# Patient Record
Sex: MALE | Race: BLACK OR AFRICAN AMERICAN
[De-identification: names, ages, dates, MRNs, and addresses within clinical notes are randomized per-mention and may not be internally consistent; named-entity substitution may affect disease eponyms.]

---

## 2020-02-17 LAB
ANION GAP SERPL CALC-SCNC: 14 MMOL/L (ref 8–16)
BASOPHILS # BLD AUTO: 0 10*3/UL (ref 0–0.1)
BASOPHILS NFR BLD AUTO: 0.3 % (ref 0–1)
BUN SERPL-MCNC: 18 MG/DL (ref 7–26)
BUN/CREAT SERPL: 13 (ref 6–25)
CALCIUM SERPL-MCNC: 9.9 MG/DL (ref 8.4–10.2)
CHLORIDE SERPL-SCNC: 103 MMOL/L (ref 98–107)
CO2 SERPL-SCNC: 26 MMOL/L (ref 22–29)
DEPRECATED NEUTROPHILS # BLD AUTO: 3.2 10*3/UL (ref 2.1–6.9)
EGFRCR SERPLBLD CKD-EPI 2021: > 60 ML/MIN (ref 60–?)
EOSINOPHIL # BLD AUTO: 0.2 10*3/UL (ref 0–0.4)
EOSINOPHIL NFR BLD AUTO: 2.5 % (ref 0–6)
ERYTHROCYTE [DISTWIDTH] IN CORD BLOOD: 14.4 % (ref 11.7–14.4)
GLUCOSE SERPLBLD-MCNC: 156 MG/DL (ref 74–118)
HCT VFR BLD AUTO: 37.4 % (ref 38.2–49.6)
HGB BLD-MCNC: 11.7 G/DL (ref 14–18)
LYMPHOCYTES # BLD: 2.1 10*3/UL (ref 1–3.2)
LYMPHOCYTES NFR BLD AUTO: 34.7 % (ref 18–39.1)
MCH RBC QN AUTO: 28.5 PG (ref 28–32)
MCHC RBC AUTO-ENTMCNC: 31.3 G/DL (ref 31–35)
MCV RBC AUTO: 91 FL (ref 81–99)
MONOCYTES # BLD AUTO: 0.5 10*3/UL (ref 0.2–0.8)
MONOCYTES NFR BLD AUTO: 8.8 % (ref 4.4–11.3)
NEUTS SEG NFR BLD AUTO: 53.5 % (ref 38.7–80)
PLATELET # BLD AUTO: 275 X10E3/UL (ref 140–360)
POTASSIUM SERPL-SCNC: 4 MMOL/L (ref 3.5–5.1)
RBC # BLD AUTO: 4.11 X10E6/UL (ref 4.3–5.7)
SODIUM SERPL-SCNC: 139 MMOL/L (ref 136–145)

## 2020-02-17 NOTE — DIAGNOSTIC IMAGING REPORT
EXAMINATION:  CHEST 2 VIEWS    



INDICATION: Pre-operative



COMPARISON: None

     

FINDINGS:



LINES/TUBES:None



LUNGS:The lungs are well-inflated. No focal consolidation or pulmonary edema.



PLEURA:No pleural effusion or pneumothorax.



MEDIASTINUM:The cardiomediastinal silhouette appears normal in size and shape.



BONES/SOFT TISSUES:No acute osseous injury.



ABDOMEN:No free air under the diaphragm.





IMPRESSION: 

No focal pneumonia or pulmonary edema.



Signed by: Skyler Rubio MD on 2/17/2020 11:22 AM

## 2020-02-20 ENCOUNTER — HOSPITAL ENCOUNTER (OUTPATIENT)
Dept: HOSPITAL 88 - OR | Age: 65
Discharge: HOME | End: 2020-02-20
Attending: UROLOGY
Payer: COMMERCIAL

## 2020-02-20 VITALS — SYSTOLIC BLOOD PRESSURE: 136 MMHG | DIASTOLIC BLOOD PRESSURE: 74 MMHG

## 2020-02-20 DIAGNOSIS — Z79.84: ICD-10-CM

## 2020-02-20 DIAGNOSIS — N43.3: Primary | ICD-10-CM

## 2020-02-20 DIAGNOSIS — E66.01: ICD-10-CM

## 2020-02-20 DIAGNOSIS — Z79.82: ICD-10-CM

## 2020-02-20 DIAGNOSIS — Z01.812: ICD-10-CM

## 2020-02-20 DIAGNOSIS — N39.0: ICD-10-CM

## 2020-02-20 DIAGNOSIS — N32.0: ICD-10-CM

## 2020-02-20 DIAGNOSIS — Z01.810: ICD-10-CM

## 2020-02-20 DIAGNOSIS — E78.5: ICD-10-CM

## 2020-02-20 DIAGNOSIS — E11.22: ICD-10-CM

## 2020-02-20 DIAGNOSIS — K21.9: ICD-10-CM

## 2020-02-20 DIAGNOSIS — I12.9: ICD-10-CM

## 2020-02-20 DIAGNOSIS — N18.3: ICD-10-CM

## 2020-02-20 DIAGNOSIS — N40.0: ICD-10-CM

## 2020-02-20 DIAGNOSIS — Z01.818: ICD-10-CM

## 2020-02-20 DIAGNOSIS — G47.33: ICD-10-CM

## 2020-02-20 PROCEDURE — 80048 BASIC METABOLIC PNL TOTAL CA: CPT

## 2020-02-20 PROCEDURE — 71046 X-RAY EXAM CHEST 2 VIEWS: CPT

## 2020-02-20 PROCEDURE — 85025 COMPLETE CBC W/AUTO DIFF WBC: CPT

## 2020-02-20 PROCEDURE — 93005 ELECTROCARDIOGRAM TRACING: CPT

## 2020-02-20 PROCEDURE — 55040 REMOVAL OF HYDROCELE: CPT

## 2020-02-20 PROCEDURE — 36415 COLL VENOUS BLD VENIPUNCTURE: CPT

## 2020-02-20 PROCEDURE — 82948 REAGENT STRIP/BLOOD GLUCOSE: CPT

## 2020-02-20 NOTE — XMS REPORT
Patient Summary Document

                             Created on: 2020



JOSELIN RANKIN

External Reference #: [id number]

: 1955

Sex: Male



Demographics







                          Address                   [address]  [address]

 

                          Home Phone                [phone]

 

                          Preferred Language        Unknown

 

                          Marital Status            Unknown

 

                          Adventism Affiliation     Unknown

 

                          Race                      Unknown

 

                                        Additional Race(s)  

 

                          Ethnic Group              Unknown





Author







                          Author                    [facility]

 

                          [facility]

 

                          Address                   Unknown

 

                          Phone                     Unavailable







Care Team Providers







                    Care Team Member Name    Role                Phone

 

                    GUMARO LIND    Unavailable         Unavailable







Problems

This patient has no known problems.



Allergies, Adverse Reactions, Alerts

This patient has no known allergies or adverse reactions.



Medications

This patient has no known medications.



Results







           Test Description    Test Time    Test Comments    Text Results    Atomic Results    Result

 Comments

 

                CHEST 2 VIEWS    2020 11:21:00                                                             

                                             [place] [address]  
   Patient Name: JOSELIN RANKIN                                   MR #: 
[id number]                     : 1955                                  
Age/Sex: 65/M  Acct #: [id number]                              Req #: [id number]
[id number]  Adm Physician:                                                      
Ordered by: MIRIAM LIND MD                            Report #: [id number]
       Location: OR                                      Room/Bed:              
      
___________________________________________________________________________________________________
   Procedure: [id number] DX/CHEST 2 VIEWS  Exam Date: 20                   
        Exam Time: 1056                                              REPORT 
STATUS: Signed    EXAMINATION:  CHEST 2 VIEWS          INDICATION: Pre-operative
     COMPARISON: None           FINDINGS:      LINES/TUBES:None      LUNGS:The 
lungs are well-inflated. No focal consolidation or pulmonary edema.      
PLEURA:No pleural effusion or pneumothorax.      MEDIASTINUM:The 
cardiomediastinal silhouette appears normal in size and shape.      BONES/SOFT 
TISSUES:No acute osseous injury.      ABDOMEN:No free air under the diaphragm.  
      IMPRESSION:    No focal pneumonia or pulmonary edema.      Signed by: 
Joe Das MD on 2020 11:22 AM        Dictated By: JOE DAS MD  
Electronically Signed By: JOE DAS MD on 20 112  Transcribed By: 
CLAUDIA on 20 112       COPY TO:   MIRIAM LIND MD

## 2020-02-27 NOTE — OPERATIVE REPORT
DATE OF PROCEDURE:  02/20/2020

 

SURGEON:  Norris Milner MD

 

PREOPERATIVE DIAGNOSIS:  Left hydrocele.

 

POSTOPERATIVE DIAGNOSIS:  Left hydrocele.

 

OPERATIVE PROCEDURE PERFORMED:  Left hydrocelectomy.

 

ANESTHESIA:  General anesthesia.

 

ESTIMATED BLOOD LOSS:  Minimal.

 

INDICATIONS:  Mr. Mt Norris is a 65-year-old gentleman with urinary tract infection

resulting in left epididymo-orchitis.  This produced a hydrocele, which has been

increasing in size and bothersome.  He now presents for definitive management of this

problem. 

 

PROCEDURE IN DETAIL:  The patient was brought to the operative room, placed in supine

position.  After administration of general anesthesia, was prepped and draped in usual

sterile fashion.  A horizontal incision was made over the left hemiscrotum and

dissection was carried out through the layers of the scrotum.  The tunica vaginalis was

opened and approximately 250 mL of straw-colored fluid was removed.  The testicle and

epididymis were completely examined.  There was moderate induration noted of the

epididymis, but there were no testicular or epididymal masses.  The epididymis, testis,

and appendix testis were both removed with the electrocautery device.  The cut edges of

the tunica vaginalis were then fulgurated and oversewn with a running chromic suture.

The testicle was then returned to its normal anatomic position of the scrotum and a

quarter-inch Penrose drain was allowed to drain in the inferior aspect of the left

hemiscrotum.  This was secured to the skin using a chromic suture.  The dartos was then

reapproximated closed with a running locking chromic suture and the skin was closed with

a running chromic suture.  The wound was then cleaned and dried and covered with Telfa,

sterile fluffs, and placed in a scrotal support.  Anesthesia was reversed and the

patient was transferred to a bed and taken to the postanesthesia care unit in good

condition.  Of note, the needle and instrument count were correct at the conclusion of

the case. 

 

 

 

 

______________________________

Norris Milner MD

 

HLW/MODL

D:  02/27/2020 08:10:05

T:  02/27/2020 14:46:56

Job #:  [id number]